# Patient Record
Sex: FEMALE | Race: WHITE | NOT HISPANIC OR LATINO | Employment: OTHER | URBAN - METROPOLITAN AREA
[De-identification: names, ages, dates, MRNs, and addresses within clinical notes are randomized per-mention and may not be internally consistent; named-entity substitution may affect disease eponyms.]

---

## 2019-12-18 ENCOUNTER — THERAPY VISIT (OUTPATIENT)
Dept: PHYSICAL THERAPY | Facility: CLINIC | Age: 84
End: 2019-12-18
Payer: COMMERCIAL

## 2019-12-18 DIAGNOSIS — M54.42 ACUTE LEFT-SIDED LOW BACK PAIN WITH LEFT-SIDED SCIATICA: ICD-10-CM

## 2019-12-18 PROCEDURE — 97161 PT EVAL LOW COMPLEX 20 MIN: CPT | Mod: GP | Performed by: PHYSICAL THERAPIST

## 2019-12-18 PROCEDURE — 97110 THERAPEUTIC EXERCISES: CPT | Mod: GP | Performed by: PHYSICAL THERAPIST

## 2019-12-18 NOTE — PROGRESS NOTES
[facility] Initial Evaluation -- Lumbar    Date: December 18, 2019  Ethel Conrad is a 89 year old female with a lumbar spine condition.   Referral: Ortho  Work mechanical stresses:  NA  Employment status:  Retired  Leisure mechanical stresses: No formal exercise  Functional disability score (TICO/STarT Back):  See flowsheets  VAS score (0-10): 3-7/10 varying intensity  Patient goals/expectations:  Reduce pain in leg and walk without having to rely on cane    HISTORY:    Present symptoms: Lt low back, posterior thigh to lower leg  Pain quality (sharp/shooting/stabbing/aching/burning/cramping):  Achy, and sharp   Paresthesia (yes/no):  No    Present since (onset date): 2 weeks (Dec 2019).     Symptoms (improving/unchanging/worsening):  unchanging.     Symptoms commenced as a result of: VIKY   Condition occurred in the following environment:   Unknown     Symptoms at onset (back/thigh/leg): As above  Constant symptoms (back/thigh/leg):   Intermittent symptoms (back/thigh/leg): As above and unpredictable in nature    Symptoms are made worse with the following: Sometimes Sitting, Sometimes Rising, Sometimes Standing, Sometimes Walking, Time of day - Sometimes PM, Sometimes When still and Sometimes On the move   Symptoms are made better with the following: Other - Heat sometimes    Disturbed sleep (yes/no):  Yes Sleeping postures (prone/sup/side R/L): Lt side    Previous episodes (0/1-5/6-10/11+): 0 Year of first episode:     Previous history: No  Previous treatments: None      Specific Questions:  Cough/Sneeze/Strain (pos/neg): Neg  Bowel/Bladder (normal/abnormal): Normal  Gait (normal/abnormal): Normal  Medications (nil/NSAIDS/analg/steroids/anticoag/other):  Other - High blood pressure  Medical allergies:  None  General health (excellent/good/fair/poor):  Good  Pertinent medical history:  Heart problems and High blood pressure  Imaging (None/Xray/MRI/Other):  X-ray  Recent or major surgery  (yes/no):  Lt TKA   Night pain (yes/no): Yes, will drive from bed but not every night.  Accidents (yes/no): No  Unexplained weight loss (yes/no): No  Barriers at home: Normal  Other red flags: Normal    EXAMINATION    Posture:   Sitting (good/fair/poor): Fair  Standing (good/fair/poor):Fair  Lordosis (red/acc/normal):   Correction of posture (better/worse/no effect): No effect    Lateral Shift (right/left/nil): Nil  Relevant (yes/no):    Other Observations:     Neurological:    Motor deficit:  Hip Flex 4-/5 (B); Quad Rt 5/5 Lt 4+/5; H-S 5/5 (B); DF Rt 5/5 Lt 4+/5  Reflexes:  NT  Sensory deficit:  NT  Dural signs:  Neg slump (B)    Movement Loss:   Claus Mod Min Nil Pain   Flexion    X    Extension   X     Side Gliding R   X     Side Gliding L   X       Test Movements:   During: produces, abolishes, increases, decreases, no effect, centralizing, peripheralizing   After: better, worse, no better, no worse, no effect, centralized, peripheralized    Pretest symptoms standin/10 pain   Symptoms During Symptoms After ROM increased ROM decreased No Effect   FISit No Effect    No Effect         Rep FISit No Effect    No Effect      X   EIS No Effect    No Effect         Rep EIS No Effect    No Effect      X   Pretest symptoms lying:     Symptoms During Symptoms After ROM increased ROM decreased No Effect   BON        Rep BON        EIL        Rep EIL        If required, pretest symptoms:    Symptoms During Symptoms After ROM increased ROM decreased No Effect   SGIS - R        Rep SGIS - R        SGIS - L        Rep SGIS - L          Static Tests:  Sitting slouched:    Sitting erect:    Standing slouched   Standing erect:    Lying prone in extension:   Long sitting:      Other Tests:     Provisional Classification:  Inconclusive/Other - Mechanically Inconclusive    Principle of Management:  Education:  Specificity of exercise and rationale with repeated movements, centralization vs peripheralization and testing EIS when  noticing sx'sto assess response  Equipment provided:  None  Mechanical therapy (Y/N):  Y   Extension principle:  EIS x 10-15 reps 4-5 x daily and when noticing sx's    ASSESSMENT/PLAN:    Patient is a 89 year old female with lumbar complaints.    Patient has the following significant findings with corresponding treatment plan.                Diagnosis 1:  LBP/Lt leg pain    Pain -  self management, education, directional preference exercise and home program  Decreased ROM/flexibility - manual therapy, therapeutic exercise and home program  Decreased joint mobility - manual therapy, therapeutic exercise and home program  Decreased strength - therapeutic exercise, therapeutic activities and home program  Impaired muscle performance - neuro re-education and home program  Decreased function - therapeutic activities and home program  Impaired posture - neuro re-education and home program    Previous and current functional limitations:  (See Goal Flow Sheet for this information)    Short term and Long term goals: (See Goal Flow Sheet for this information)     Communication ability:  Patient appears to be able to clearly communicate and understand verbal and written communication and follow directions correctly.  Treatment Explanation - The following has been discussed with the patient:   RX ordered/plan of care  Anticipated outcomes  Possible risks and side effects  This patient would benefit from PT intervention to resume normal activities.   Rehab potential is good.    Frequency:  1 X week, once daily  Duration:  for 6 weeks  Discharge Plan:  Achieve all LTG.  Independent in home treatment program.  Reach maximal therapeutic benefit.    Please refer to the daily flowsheet for treatment today, total treatment time and time spent performing 1:1 timed codes.

## 2019-12-18 NOTE — LETTER
[facility] PHYSICAL THERAPY  [address]  [facility] [address]  [phone]    2019    Re: Ethel Conrad   :   1926  MRN:  [id number]   REFERRING PHYSICIAN:   Chet Delgado    [facility] PHYSICAL THERAPY    Date of Initial Evaluation:  2019  Visits:   1  Reason for Referral:  Acute left-sided low back pain with left-sided sciatica    [facility] Initial Evaluation -- Lumbar  Date: 2019  Ethel Conrad is a 89 year old female with a lumbar spine condition.   Referral: Ortho  Work mechanical stresses:  NA  Employment status:  Retired  Leisure mechanical stresses: No formal exercise  Functional disability score (TICO/STarT Back):  See flowsheets  VAS score (0-10): 3-7/10 varying intensity  Patient goals/expectations:  Reduce pain in leg and walk without having to rely on cane    HISTORY:  Present symptoms: Lt low back, posterior thigh to lower leg  Pain quality (sharp/shooting/stabbing/aching/burning/cramping):  Achy, and sharp   Paresthesia (yes/no):  No  Present since (onset date): 2 weeks (Dec 2019).     Symptoms (improving/unchanging/worsening):  unchanging.   Symptoms commenced as a result of: VIKY   Condition occurred in the following environment:   Unknown   Symptoms at onset (back/thigh/leg): As above  Constant symptoms (back/thigh/leg):   Intermittent symptoms (back/thigh/leg): As above and unpredictable in nature  Symptoms are made worse with the following: Sometimes Sitting, Sometimes Rising, Sometimes Standing, Sometimes Walking, Time of day - Sometimes PM, Sometimes When still and Sometimes On the move   Symptoms are made better with the following: Other - Heat sometimes  Disturbed sleep (yes/no):  Yes   Sleeping postures (prone/sup/side R/L): Lt side  Previous episodes (0/1-5/6-10/11+): 0   Year of first episode:   Previous history: No  Previous treatments:  None        Re: Ethel Conrad   :   1926    Specific Questions:  Cough/Sneeze/Strain (pos/neg): Neg  Bowel/Bladder (normal/abnormal): Normal  Gait (normal/abnormal): Normal  Medications (nil/NSAIDS/analg/steroids/anticoag/other):  Other - High blood pressure  Medical allergies:  None  General health (excellent/good/fair/poor):  Good  Pertinent medical history:  Heart problems and High blood pressure  Imaging (None/Xray/MRI/Other):  X-ray  Recent or major surgery (yes/no):  Lt TKA   Night pain (yes/no): Yes, will drive from bed but not every night.  Accidents (yes/no): No  Unexplained weight loss (yes/no): No  Barriers at home: Normal  Other red flags: Normal    EXAMINATION    Posture:   Sitting (good/fair/poor): Fair  Standing (good/fair/poor):Fair  Lordosis (red/acc/normal):   Correction of posture (better/worse/no effect): No effect  Lateral Shift (right/left/nil): Nil  Relevant (yes/no):    Other Observations:     Neurological:  Motor deficit:  Hip Flex 4-/5 (B); Quad Rt 5/5 Lt 4+/5; H-S 5/5 (B); DF Rt 5/5 Lt 4+/5  Reflexes:  NT  Sensory deficit:  NT    Dural signs:  Neg slump (B)    Movement Loss:   Claus Mod Min Nil Pain   Flexion    X    Extension   X     Side Gliding R   X     Side Gliding L   X       Test Movements:   During: produces, abolishes, increases, decreases, no effect, centralizing, peripheralizing   After: better, worse, no better, no worse, no effect, centralized, peripheralized            Re: Ethel Conrad   :   1926    Pretest symptoms standin/10 pain   Symptoms During Symptoms After ROM increased ROM decreased No Effect   FISit No Effect    No Effect         Rep FISit No Effect    No Effect      X   EIS No Effect    No Effect         Rep EIS No Effect    No Effect      X   Pretest symptoms lying:     Symptoms During Symptoms After ROM increased ROM decreased No Effect   BON        Rep BON        EIL        Rep EIL        If required, pretest symptoms:    Symptoms During  Symptoms After ROM increased ROM decreased No Effect   SGIS - R        Rep SGIS - R        SGIS - L        Rep SGIS - L        Static Tests:  Sitting slouched:     Sitting erect:    Standing slouched    Standing erect:    Lying prone in extension:    Long sitting:    Other Tests:   Provisional Classification:  Inconclusive/Other - Mechanically Inconclusive  Principle of Management:  Education:  Specificity of exercise and rationale with repeated movements, centralization vs peripheralization and testing EIS when noticing sx'sto assess response  Equipment provided:  None  Mechanical therapy (Y/N):  Y   Extension principle:  EIS x 10-15 reps 4-5 x daily and when noticing sx's    ASSESSMENT/PLAN:  Patient is a 89 year old female with lumbar complaints.    Patient has the following significant findings with corresponding treatment plan.                Diagnosis 1:  LBP/Lt leg pain  Pain -  self management, education, directional preference exercise and home program  Decreased ROM/flexibility - manual therapy, therapeutic exercise and home program  Decreased joint mobility - manual therapy, therapeutic exercise and home program  Decreased strength - therapeutic exercise, therapeutic activities and home program  Impaired muscle performance - neuro re-education and home program  Decreased function - therapeutic activities and home program  Impaired posture - neuro re-education and home program  Re: Ethel A Anamaria   :   1926    ASSESSMENT/PLAN: (continued):  Previous and current functional limitations:  (See Goal Flow Sheet for this information)    Short term and Long term goals: (See Goal Flow Sheet for this information)   Communication ability:  Patient appears to be able to clearly communicate and understand verbal and written communication and follow directions correctly.  Treatment Explanation - The following has been discussed with the patient:   RX ordered/plan of care, Anticipated outcomes, Possible risks  and side effects    This patient would benefit from PT intervention to resume normal activities.   Rehab potential is good.  Frequency:  1 X week, once daily  Duration:  for 6 weeks  Discharge Plan:  Achieve all LTG.  Independent in home treatment program.  Reach maximal therapeutic benefit.    Thank you for your referral.    INQUIRIES          Therapist:  Porter Teague DPT, Cert. MDT  [facility]  [address] [facility] [address]  [facility] [address]  Phone: [phone]  Fax: [phone]

## 2019-12-24 ENCOUNTER — THERAPY VISIT (OUTPATIENT)
Dept: PHYSICAL THERAPY | Facility: CLINIC | Age: 84
End: 2019-12-24
Payer: COMMERCIAL

## 2019-12-24 DIAGNOSIS — M54.42 ACUTE LEFT-SIDED LOW BACK PAIN WITH LEFT-SIDED SCIATICA: ICD-10-CM

## 2019-12-24 PROCEDURE — 97110 THERAPEUTIC EXERCISES: CPT | Mod: GP | Performed by: PHYSICAL THERAPIST

## 2020-01-02 ENCOUNTER — THERAPY VISIT (OUTPATIENT)
Dept: PHYSICAL THERAPY | Facility: CLINIC | Age: 85
End: 2020-01-02
Payer: COMMERCIAL

## 2020-01-02 DIAGNOSIS — M54.42 ACUTE LEFT-SIDED LOW BACK PAIN WITH LEFT-SIDED SCIATICA: ICD-10-CM

## 2020-01-02 PROCEDURE — 97110 THERAPEUTIC EXERCISES: CPT | Mod: GP | Performed by: PHYSICAL THERAPIST

## 2020-01-02 NOTE — PROGRESS NOTES
DISCHARGE REPORT    Progress reporting period is from 12.18.19 to 1.2.2020.       SUBJECTIVE  Subjective changes noted by patient:  Pt reports that she has not been having any pain in back/leg.  Still feeling some weakness when up walking and going up/down stairs but has been better overall.  Still doing stretch 4-5 x daily.    Current Pain level: 0/10.     Initial Pain level: (3-7/10).   Changes in function:  Yes (See Goal flowsheet attached for changes in current functional level)  Adverse reaction to treatment or activity: None    OBJECTIVE  Objective: L/S AROM:  Flex WNL; Ext Min loss; SG WNL (B).  Strength:  Hip Flex 4/5 (B); Knee Ext 5/5 (B); Knee Flex 5/5 (B); DF 5/5 (B); PF 5/5 (B).      ASSESSMENT/PLAN  Updated problem list and treatment plan:   Diagnosis 1:  LBP/Lt Leg pain    Decreased ROM/flexibility - therapeutic exercise and home program  Decreased joint mobility - therapeutic exercise and home program  Decreased strength - therapeutic exercise and home program  Impaired muscle performance - home program  STG/LTGs have been met or progress has been made towards goals:  Yes (See Goal flow sheet completed today.)  Assessment of Progress: The patient's condition is improving.  The patient has met all of their long term goals.  Self Management Plans:  Patient is independent in a home treatment program.  Patient is independent in self management of symptoms.  I have re-evaluated this patient and find that the nature, scope, duration and intensity of the therapy is appropriate for the medical condition of the patient.  Ethel continues to require the following intervention to meet STG and LTG's:  PT intervention is no longer required to meet STG/LTG.    Recommendations:  This patient is ready to be discharged from therapy and continue their home treatment program.

## 2020-01-02 NOTE — LETTER
[facility] ATHLETIC [facility] PHYSICAL THERAPY  [address]   [place] [address]  [phone]    January 15, 2020    Re: Ethel Conrad   :   1926  MRN:  [id number]   REFERRING PHYSICIAN:   Chet Delgado    [facility] PHYSICAL THERAPY    Date of Initial Evaluation:  2019  Visits:    3  Reason for Referral:  Acute left-sided low back pain with left-sided sciatica    DISCHARGE REPORT:  Progress reporting period is from 19 to 2020.       SUBJECTIVE  Subjective changes noted by patient:  Pt reports that she has not been having any pain in back/leg.  Still feeling some weakness when up walking and going up/down stairs but has been better overall.  Still doing stretch 4-5 x daily.    Current Pain level: 0/10.   Initial Pain level: (3-7/10).   Changes in function:  Yes (See Goal flowsheet attached for changes in current functional level). Adverse reaction to treatment or activity: None    OBJECTIVE  Objective: L/S AROM:  Flex WNL; Ext Min loss; SG WNL (B).  Strength:  Hip Flex 4/5 (B); Knee Ext 5/5 (B); Knee Flex 5/5 (B); DF 5/5 (B); PF 5/5 (B).    ASSESSMENT/PLAN  Updated problem list and treatment plan:   Diagnosis 1:  LBP/Lt Leg pain    Decreased ROM/flexibility - therapeutic exercise and home program  Decreased joint mobility - therapeutic exercise and home program  Decreased strength - therapeutic exercise and home program  Impaired muscle performance - home program  STG/LTGs have been met or progress has been made towards goals:  Yes (See Goal flow sheet completed today.)  Assessment of Progress: The patient's condition is improving.  The patient has met all of their long term goals.  Self Management Plans:  Patient is independent in a home treatment program.  Patient is independent in self management of symptoms.  I have re-evaluated this patient and find that the nature, scope, duration and intensity of the therapy is  appropriate for the medical condition of the patient.  Ethel continues to require the following intervention to meet STG and LTG's:  PT intervention is no longer required to meet STG/LTG.            Re: Ethel Conrad   :   1926    Recommendations:  This patient is ready to be discharged from therapy and continue their home treatment program.    Thank you for your referral.    INQUIRIES        Therapist:   Porter Teague DPT, Cert. MDT  [facility] PHYSICAL THERAPY  [address] [address]  Phone: [phone]  Fax: [phone]

## 2021-05-21 ENCOUNTER — RECORDS - HEALTHEAST (OUTPATIENT)
Dept: LAB | Facility: CLINIC | Age: 86
End: 2021-05-21

## 2021-05-21 LAB
ALBUMIN UR-MCNC: NEGATIVE G/DL
APPEARANCE UR: CLEAR
BILIRUB UR QL STRIP: NEGATIVE
COLOR UR AUTO: COLORLESS
GLUCOSE UR STRIP-MCNC: NEGATIVE MG/DL
HGB UR QL STRIP: NEGATIVE
KETONES UR STRIP-MCNC: NEGATIVE MG/DL
LEUKOCYTE ESTERASE UR QL STRIP: NEGATIVE
NITRATE UR QL: NEGATIVE
PH UR STRIP: 6.5 [PH] (ref 5–8)
SP GR UR STRIP: 1.01 (ref 1–1.03)
UROBILINOGEN UR STRIP-ACNC: NORMAL

## 2021-05-22 ENCOUNTER — RECORDS - HEALTHEAST (OUTPATIENT)
Dept: LAB | Facility: CLINIC | Age: 86
End: 2021-05-22

## 2021-05-22 LAB — BACTERIA SPEC CULT: NO GROWTH

## 2021-05-24 LAB
ALBUMIN SERPL-MCNC: 3.7 G/DL (ref 3.5–5)
ALP SERPL-CCNC: 74 U/L (ref 45–120)
ALT SERPL W P-5'-P-CCNC: 14 U/L (ref 0–45)
ANION GAP SERPL CALCULATED.3IONS-SCNC: 16 MMOL/L (ref 5–18)
AST SERPL W P-5'-P-CCNC: 20 U/L (ref 0–40)
BASOPHILS # BLD AUTO: 0.1 THOU/UL (ref 0–0.2)
BASOPHILS NFR BLD AUTO: 1 % (ref 0–2)
BILIRUB SERPL-MCNC: 0.9 MG/DL (ref 0–1)
BUN SERPL-MCNC: 33 MG/DL (ref 8–28)
CALCIUM SERPL-MCNC: 10.1 MG/DL (ref 8.5–10.5)
CHLORIDE BLD-SCNC: 104 MMOL/L (ref 98–107)
CO2 SERPL-SCNC: 28 MMOL/L (ref 22–31)
CREAT SERPL-MCNC: 1.58 MG/DL (ref 0.6–1.1)
EOSINOPHIL # BLD AUTO: 0.2 THOU/UL (ref 0–0.4)
EOSINOPHIL NFR BLD AUTO: 3 % (ref 0–6)
ERYTHROCYTE [DISTWIDTH] IN BLOOD BY AUTOMATED COUNT: 13.3 % (ref 11–14.5)
GFR SERPL CREATININE-BSD FRML MDRD: 30 ML/MIN/1.73M2
GLUCOSE BLD-MCNC: 104 MG/DL (ref 70–125)
HCT VFR BLD AUTO: 42.6 % (ref 35–47)
HGB BLD-MCNC: 13.2 G/DL (ref 12–16)
IMM GRANULOCYTES # BLD: 0 THOU/UL
IMM GRANULOCYTES NFR BLD: 0 %
LYMPHOCYTES # BLD AUTO: 1.4 THOU/UL (ref 0.8–4.4)
LYMPHOCYTES NFR BLD AUTO: 23 % (ref 20–40)
MCH RBC QN AUTO: 30.5 PG (ref 27–34)
MCHC RBC AUTO-ENTMCNC: 31 G/DL (ref 32–36)
MCV RBC AUTO: 98 FL (ref 80–100)
MONOCYTES # BLD AUTO: 0.6 THOU/UL (ref 0–0.9)
MONOCYTES NFR BLD AUTO: 10 % (ref 2–10)
NEUTROPHILS # BLD AUTO: 3.7 THOU/UL (ref 2–7.7)
NEUTROPHILS NFR BLD AUTO: 63 % (ref 50–70)
PLATELET # BLD AUTO: 287 THOU/UL (ref 140–440)
PMV BLD AUTO: 11.4 FL (ref 8.5–12.5)
POTASSIUM BLD-SCNC: 3.5 MMOL/L (ref 3.5–5)
PROT SERPL-MCNC: 6.5 G/DL (ref 6–8)
RBC # BLD AUTO: 4.33 MILL/UL (ref 3.8–5.4)
SODIUM SERPL-SCNC: 148 MMOL/L (ref 136–145)
WBC: 6 THOU/UL (ref 4–11)

## 2021-05-27 ENCOUNTER — RECORDS - HEALTHEAST (OUTPATIENT)
Dept: LAB | Facility: CLINIC | Age: 86
End: 2021-05-27

## 2021-05-28 LAB — POTASSIUM BLD-SCNC: 4.4 MMOL/L (ref 3.5–5)

## 2021-06-08 ENCOUNTER — RECORDS - HEALTHEAST (OUTPATIENT)
Dept: LAB | Facility: CLINIC | Age: 86
End: 2021-06-08

## 2021-06-09 LAB
ANION GAP SERPL CALCULATED.3IONS-SCNC: 9 MMOL/L (ref 5–18)
BUN SERPL-MCNC: 30 MG/DL (ref 8–28)
CALCIUM SERPL-MCNC: 8.7 MG/DL (ref 8.5–10.5)
CHLORIDE BLD-SCNC: 108 MMOL/L (ref 98–107)
CO2 SERPL-SCNC: 26 MMOL/L (ref 22–31)
CREAT SERPL-MCNC: 1.2 MG/DL (ref 0.6–1.1)
GFR SERPL CREATININE-BSD FRML MDRD: 42 ML/MIN/1.73M2
GLUCOSE BLD-MCNC: 77 MG/DL (ref 70–125)
POTASSIUM BLD-SCNC: 3.3 MMOL/L (ref 3.5–5)
SODIUM SERPL-SCNC: 143 MMOL/L (ref 136–145)

## 2021-06-10 ENCOUNTER — RECORDS - HEALTHEAST (OUTPATIENT)
Dept: LAB | Facility: CLINIC | Age: 86
End: 2021-06-10

## 2021-06-12 LAB
ERYTHROCYTE [DISTWIDTH] IN BLOOD BY AUTOMATED COUNT: 13.7 % (ref 11–14.5)
HCT VFR BLD AUTO: 38 % (ref 35–47)
HGB BLD-MCNC: 12.5 G/DL (ref 12–16)
MCH RBC QN AUTO: 31.3 PG (ref 27–34)
MCHC RBC AUTO-ENTMCNC: 32.9 G/DL (ref 32–36)
MCV RBC AUTO: 95 FL (ref 80–100)
PLATELET # BLD AUTO: 180 THOU/UL (ref 140–440)
PMV BLD AUTO: 11.3 FL (ref 8.5–12.5)
RBC # BLD AUTO: 4 MILL/UL (ref 3.8–5.4)
WBC: 5.9 THOU/UL (ref 4–11)

## 2022-06-16 ENCOUNTER — TRANSCRIBE ORDERS (OUTPATIENT)
Dept: OTHER | Age: 87
End: 2022-06-16

## 2022-06-16 DIAGNOSIS — M62.81 MUSCLE WEAKNESS: Primary | ICD-10-CM

## 2022-06-27 ENCOUNTER — THERAPY VISIT (OUTPATIENT)
Dept: PHYSICAL THERAPY | Facility: CLINIC | Age: 87
End: 2022-06-27
Attending: STUDENT IN AN ORGANIZED HEALTH CARE EDUCATION/TRAINING PROGRAM
Payer: COMMERCIAL

## 2022-06-27 DIAGNOSIS — M62.81 MUSCLE WEAKNESS: Primary | ICD-10-CM

## 2022-06-27 PROCEDURE — 97162 PT EVAL MOD COMPLEX 30 MIN: CPT | Mod: GP | Performed by: PHYSICAL THERAPIST

## 2022-06-27 PROCEDURE — 97530 THERAPEUTIC ACTIVITIES: CPT | Mod: GP | Performed by: PHYSICAL THERAPIST

## 2022-06-27 PROCEDURE — 97110 THERAPEUTIC EXERCISES: CPT | Mod: GP | Performed by: PHYSICAL THERAPIST

## 2022-06-27 NOTE — PROGRESS NOTES
"[facility] Initial Evaluation -- Lower Extremity    Evaluation Date: June 27, 2022  Ethel Conrad is a 89 year old female with a Weakness condition.   Referral: DEANNA  Work mechanical stresses: Retired   Employment status:   Leisure mechanical stresses: unable to answer  Functional disability score:   VAS score (0-10): unable to answer  Patient goals/expectations:  Be more steady w/ walking and incr strength to walk longer distances.    HISTORY:    Hx was given by pt.  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  She was in the Hospital then went to Rehab for 5-6 wks.  Weakness L side had resolved and she was walking w/o difficulty.  Pt reports that she has had increase weakness, L>R for the past 2-4 mos making it hard for her to walk.  1-2 mos ago, she was in to see PCP and passed out in the clinic - was sent to the Hospital for a couple of days.    Pt also relates that her LB will begin to hurt and feel weak if she stands or walks for 5-10 min - she needs to sit down to relieve it.  When vacuuming she needs to sit every few min d/t pain/weakness.  Pt lives in a house, 4 stairs in/out w/ railing on R side ascending.  Laundry is in the basement - children do her laundry so she does not have to go into the basement.  She tends to sit on a couch w/ a pillow behind her back when she is sitting.  Walking tolerance is functional distances, 5-10 min - c/o incr \"feeling\" in L leg and drags the L leg the longer she walks.    Stairs are 1 step at a time holding railing and cane.    Present symptoms: Leg Weakness w/ walking  Pain quality (sharp/shooting/stabbing/aching/burning/cramping):      Present since (onset date): Weakness L>R for past 2-3 mos, MD referral 6-    Symptoms (improving/unchaning/worsening):  worsening.      Symptoms commenced as a result of: Unknown   Condition occurred in the following environment: unknown     Symptoms at onset:   Paresthesia (yes/no):  "   Spinal history: yes   Cough/Sneeze (pos/neg):  neg    Constant symptoms:   Intermittent symptoms: LB aching and weakness legs    Symptoms are worse with the following: Always Standing, Always Walking, Always Stairs and Time of day - No effect   Symptoms are better with the following:     Continued use makes the pain (better/worse/no effect): worse    Disturbed night (yes/no): ??? painL shld at night      Pain at rest (yes/no):  no  Site (back/hip/knee/ankle/foot):      Other questions (swelling/clicking/locking/giving way/falling):       Previous episodes: See Above  Previous treatments:     Specific Questions:  General health (excellent/good/fair/poor):  fair  Pertinent medical history includes: Heart problems, Stroke, high cholesterol, Kidney Ds, and weakness  Medications (nil/NSAIDS/analg/steroids/anticoag/other):  Other - High blood pressure,High cholesterol, heart meds,   Medical allergies:  See Epic Chart  Imaging (none/Xray/MRI/other):    Recent or major surgery (yes/no):  TKA 9 yrs, L shld repair  Night pain (yes/no):  L shld if she lies on it  Accidents (yes/no):  no  Unexplained weight loss (yes/no):  no  Barriers at home: see above  Other red flags: Weakness, tingling??    Sites for physical examination (back/hip/knee/ankle/foot/other): back and legs    EXAMINATION    Posture:  Sitting (good/fair/poor): fair    Correction of Posture (better/worse/no effect/NA): NA  Standing (good/fair/poor): Decr Lordosis, Shld Shift L  Other observations:  Pt to PT dept w/ [facility] and wide MASOOD.   Appears somewhat unbalanced when walking.      Neurological: (NA/motor/sensory/reflexes/dural):     Baselines (pain or functional activity): Unsteady w/ walking, weakness legs and LB w/ stdg andwalking    Extremities (Hip / Knee / Ankle / Foot): Bilat legs    Movement Loss R L   Flexion     Extension - knee sitting Slight decr - R groin Pain Slight decr     Passive Movement (+/- over pressure)/(PDM/ERP):  NA d/t time  Resisted  Test Response (pain):       R    L   Hip Flex 4/5(+R groin) 4-/5(R Groin pain)   Quad  4+/5  4+/5   HS  4+/5  4+/5   Ant Tib  4/4  4/5    Other Tests:     Spine:  Movement loss:    Lumbar AROM:    Flex To toes    Ext Min decr - slight LBP    SG R Min decr - L LBP           L Min decr - L knee Pain  Effect of repeated movements:    FISitting  x10 - Reach to floor  x10 - NE (stretch LB)  NE  NE ROM or strength  Effect of static positioning: NA  Spine testing (not relevant/relevant/secondary problem): not relevant???    Baseline Symptoms: NA  Repeated Tests Symptom Response Mechanical Response   Active/Passive movement, resisted test, functional test During -  Produce, Abolish, Increase, Decrease, NE After -  Better, Worse, NB, NW, NE Effect -   ? or ? ROM, strength or key functional test No   Effect          Effect of static positioning                Provisional Classification (Extremity/Spine):  Extremity - Needs further assessment - general weakness &/or derangement/stenosis      Princicple of Management:   Education:  Discussed weakness L>R leg, pain in R groin w/ resisted movements and initial exer w/ R leg (pt states she has not had R groin pain at home), need to determine pt's baseline tolerance for exer, Stressed she needs to rest if she becomes SOB or light headed, and HEP instr.    Equipment provided:  none  Exercise and dosage:  See PTRx flowsheet    ASSESSMENT/PLAN:    Patient is a 89 year old female with lumbar and bilateral legs complaints.    Patient has the following significant findings with corresponding treatment plan.                Diagnosis 1:  Muscle weakness  Pain -  hot/cold therapy, manual therapy, self management, education, directional preference exercise and home program  Decreased ROM/flexibility - manual therapy, therapeutic exercise and home program  Decreased strength - therapeutic exercise, therapeutic activities and home program  Impaired balance - neuro re-education, therapeutic  activities and home program  Impaired gait - gait training and home program  Decreased function - therapeutic activities and home program  Impaired posture - neuro re-education and home program    Therapy Evaluation Codes:   1) History comprised of:   Personal factors that impact the plan of care:      Age.    Comorbidity factors that impact the plan of care are:      Heart problems, High blood pressure, Numbness/tingling, Pain at night/rest, Stroke, Weakness and High cholesterol and Kidney Ds.     Medications impacting care: Cardiac, High blood pressure and High cholesterol.  2) Examination of Body Systems comprised of:   Body structures and functions that impact the plan of care:      Lumbar spine and bilateral legs.   Activity limitations that impact the plan of care are:      Stairs, Standing and Walking.  3) Clinical presentation characteristics are:   Evolving/Changing.  4) Decision-Making    Moderate complexity using standardized patient assessment instrument and/or measureable assessment of functional outcome.  Cumulative Therapy Evaluation is: Moderate complexity.    Previous and current functional limitations:  (See Goal Flow Sheet for this information)    Short term and Long term goals: (See Goal Flow Sheet for this information)     Communication ability:  Patient appears to be able to clearly communicate and understand verbal and written communication and follow directions correctly.  Daughter also present for assessment  Treatment Explanation - The following has been discussed with the patient:   RX ordered/plan of care  Anticipated outcomes  Possible risks and side effects  This patient would benefit from PT intervention to resume normal activities.   Rehab potential is good.    Frequency:  1 X week, once daily  Duration:  for 4 weeks, then 2 x/month for 4 weeks  Discharge Plan:  Achieve all LTG.  Independent in home treatment program.  Reach maximal therapeutic benefit.    Please refer to the daily  flowsheet for treatment today, total treatment time and time spent performing 1:1 timed codes.

## 2022-07-09 NOTE — PROGRESS NOTES
[facility]    OUTPATIENT Physical Therapy ORTHOPEDIC EVALUATION  PLAN OF TREATMENT FOR OUTPATIENT REHABILITATION  (COMPLETE FOR INITIAL CLAIMS ONLY)  Patient's Last Name, First Name, M.I.  YOB: 1926  Ethel Conrad    Provider s Name:  [facility]   Medical Record No.  [id number]   Start of Care Date:  06/27/22   Onset Date:    (Weakness L>R for past 2-3 mos, MD referral 6-)   Type:     _X__PT   ___OT Medical Diagnosis:    Encounter Diagnosis   Name Primary?    Muscle weakness Yes        Treatment Diagnosis:  MM Weakness        Goals:     06/27/22 0500   Body Part   Goals listed below are for Weakness/L leg   Goal #1   Goal #1 ambulation   Previous Functional Level No restrictions   Current Functional Level Minutes patient can walk;with cane   Performance Level functional distances of 5 min, 10 min at most.   STG Target Performance Minutes patient will be able to walk;with cane   Performance Level 10 min w/o needing to rest   Rationale for safe household ambulation;for safe community ambulation   Due Date 08/08/22    LTG Target Performance Minutes patient will be able to  walk   Performance Level 15 min   Rationale for safe community ambulation   Due Date 09/19/22   Goal #2   Goal #2 standing   Previous Functional Level No restrictions   Current Functional Level Minutes patient can stand   Performance level 5 min tolerance - LBP and Wkness   STG Target Performance Minutes patient will be able to stand   Performance level 10 min tolerance   Rationale for personal hygiene;for housekeeping tasks such as vacuuming, bed making, mowing, gardening;for meal preparation   Due date 08/08/22   LTG Target Performance Minutes patient will be able to stand   Performance Level 15 min tolerance   Rationale for meal preparation;for housekeeping tasks such as vacuuming, bed  alexis martinez;for safe community ambulation   Due date 09/19/22       Therapy Frequency:  1 x/week  Predicted Duration of Therapy Intervention:  for 4 weeks, then 2 x/month for 4 weeks    Luli Kumar, PT                 I CERTIFY THE NEED FOR THESE SERVICES FURNISHED UNDER        THIS PLAN OF TREATMENT AND WHILE UNDER MY CARE .             Physician Signature               Date    X_____________________________________________________                         Certification Date From:  06/27/22   Certification Date To:  08/25/22    Referring Provider:  Gregorio Oliver*    Initial Assessment        See Epic Evaluation SOC Date: 06/27/22

## 2022-07-22 ENCOUNTER — THERAPY VISIT (OUTPATIENT)
Dept: PHYSICAL THERAPY | Facility: CLINIC | Age: 87
End: 2022-07-22
Payer: COMMERCIAL

## 2022-07-22 DIAGNOSIS — M62.81 MUSCLE WEAKNESS: Primary | ICD-10-CM

## 2022-07-22 PROCEDURE — 97110 THERAPEUTIC EXERCISES: CPT | Mod: GP | Performed by: PHYSICAL THERAPIST

## 2022-07-22 PROCEDURE — 97530 THERAPEUTIC ACTIVITIES: CPT | Mod: GP | Performed by: PHYSICAL THERAPIST

## 2022-08-01 ENCOUNTER — THERAPY VISIT (OUTPATIENT)
Dept: PHYSICAL THERAPY | Facility: CLINIC | Age: 87
End: 2022-08-01
Payer: COMMERCIAL

## 2022-08-01 DIAGNOSIS — M62.81 MUSCLE WEAKNESS: Primary | ICD-10-CM

## 2022-08-01 PROCEDURE — 97530 THERAPEUTIC ACTIVITIES: CPT | Mod: GP | Performed by: PHYSICAL THERAPIST

## 2022-08-01 PROCEDURE — 97110 THERAPEUTIC EXERCISES: CPT | Mod: GP | Performed by: PHYSICAL THERAPIST

## 2022-08-08 ENCOUNTER — THERAPY VISIT (OUTPATIENT)
Dept: PHYSICAL THERAPY | Facility: CLINIC | Age: 87
End: 2022-08-08
Payer: COMMERCIAL

## 2022-08-08 DIAGNOSIS — M62.81 MUSCLE WEAKNESS: Primary | ICD-10-CM

## 2022-08-08 PROCEDURE — 97110 THERAPEUTIC EXERCISES: CPT | Mod: GP | Performed by: PHYSICAL THERAPIST

## 2022-08-08 PROCEDURE — 97530 THERAPEUTIC ACTIVITIES: CPT | Mod: GP | Performed by: PHYSICAL THERAPIST

## 2023-05-01 NOTE — PROGRESS NOTES
"Discharge Note    Progress reporting period is from last progress note on   to Aug 8, 2022.    Ethel failed to follow up and current status is unknown.  Please see information below for last relevant information on current status.  Patient seen for 4 visits.    SUBJECTIVE  Subjective changes noted by patient:  Pt feeling better especially with regards to the groin pain. Groin pain pretty much resolved, no pain with exercises. Feels more like weakness limiting activities than pain. Pt feels a little more \"shaky\" today, like her legs feel tired. Pt had more leg cramps in the night. Yesterday, pt was baking and standing longer than usual which pt and her daughter think is why she is more tired today. This was the first time in awhile that she was able to stand throughout baking without taking a break. Pt went to the neighborhood night out and walked down a few houses independetly without needing to take a break. Shoulders at night have been bothering her (not a new symptom), \"feels like gravel\" in shoulder joint, L>R.  .  Current pain level is  .     Previous pain level was   (c/o wkness, not pain).   Changes in function:  Yes (See Goal flowsheet attached for changes in current functional level)  Adverse reaction to treatment or activity: None    OBJECTIVE  Changes noted in objective findings: Palpation: minimal TTP and tension to R adductors. Knee valgus with sit<>stands, pt able to correct with verbal and tactile cueing. Slight shortness of breath with NuStep, improved quickly after conclusion of exercise, no dizziness.     ASSESSMENT/PLAN  Diagnosis: MM Weakness   Updated problem list and treatment plan:   Decreased strength - HEP  STG/LTGs have been met or progress has been made towards goals:  Yes, please see goal flowsheet for most current information  Assessment of Progress: current status is unknown.    Last current status: Pt is progressing well   Self Management Plans:  HEP  I have re-evaluated this patient " and find that the nature, scope, duration and intensity of the therapy is appropriate for the medical condition of the patient.  Ethel continues to require the following intervention to meet STG and LTG's:  HEP.    Recommendations:  Discharge with current home program.  Patient to follow up with MD as needed.    Please refer to the daily flowsheet for treatment today, total treatment time and time spent performing 1:1 timed codes.

## 2024-04-15 ENCOUNTER — TRANSCRIBE ORDERS (OUTPATIENT)
Dept: OTHER | Age: 89
End: 2024-04-15

## 2024-04-15 DIAGNOSIS — R26.81 GAIT INSTABILITY: Primary | ICD-10-CM

## 2024-04-18 ENCOUNTER — THERAPY VISIT (OUTPATIENT)
Dept: PHYSICAL THERAPY | Facility: CLINIC | Age: 89
End: 2024-04-18
Payer: COMMERCIAL

## 2024-04-18 DIAGNOSIS — M62.81 MUSCLE WEAKNESS: Primary | ICD-10-CM

## 2024-04-18 DIAGNOSIS — R26.81 GAIT INSTABILITY: ICD-10-CM

## 2024-04-18 PROCEDURE — 97116 GAIT TRAINING THERAPY: CPT | Mod: GP

## 2024-04-18 PROCEDURE — 97161 PT EVAL LOW COMPLEX 20 MIN: CPT | Mod: GP

## 2024-04-18 PROCEDURE — 97110 THERAPEUTIC EXERCISES: CPT | Mod: GP

## 2024-05-07 ENCOUNTER — THERAPY VISIT (OUTPATIENT)
Dept: PHYSICAL THERAPY | Facility: CLINIC | Age: 89
End: 2024-05-07
Payer: COMMERCIAL

## 2024-05-07 DIAGNOSIS — R26.81 GAIT INSTABILITY: ICD-10-CM

## 2024-05-07 DIAGNOSIS — M62.81 MUSCLE WEAKNESS: Primary | ICD-10-CM

## 2024-05-07 PROCEDURE — 97110 THERAPEUTIC EXERCISES: CPT | Mod: GP

## 2024-05-07 PROCEDURE — 97112 NEUROMUSCULAR REEDUCATION: CPT | Mod: GP

## 2024-05-07 PROCEDURE — 97530 THERAPEUTIC ACTIVITIES: CPT | Mod: GP

## 2024-05-13 ENCOUNTER — THERAPY VISIT (OUTPATIENT)
Dept: PHYSICAL THERAPY | Facility: CLINIC | Age: 89
End: 2024-05-13
Payer: COMMERCIAL

## 2024-05-13 DIAGNOSIS — R26.81 GAIT INSTABILITY: ICD-10-CM

## 2024-05-13 DIAGNOSIS — M62.81 MUSCLE WEAKNESS: Primary | ICD-10-CM

## 2024-05-13 PROCEDURE — 97530 THERAPEUTIC ACTIVITIES: CPT | Mod: GP

## 2024-05-13 PROCEDURE — 97112 NEUROMUSCULAR REEDUCATION: CPT | Mod: GP

## 2024-05-13 PROCEDURE — 97110 THERAPEUTIC EXERCISES: CPT | Mod: GP

## 2024-05-20 ENCOUNTER — THERAPY VISIT (OUTPATIENT)
Dept: PHYSICAL THERAPY | Facility: CLINIC | Age: 89
End: 2024-05-20
Payer: COMMERCIAL

## 2024-05-20 DIAGNOSIS — R26.81 GAIT INSTABILITY: ICD-10-CM

## 2024-05-20 DIAGNOSIS — M62.81 MUSCLE WEAKNESS: Primary | ICD-10-CM

## 2024-05-20 PROCEDURE — 97110 THERAPEUTIC EXERCISES: CPT | Mod: GP

## 2024-05-20 PROCEDURE — 97112 NEUROMUSCULAR REEDUCATION: CPT | Mod: GP

## 2024-05-28 ENCOUNTER — THERAPY VISIT (OUTPATIENT)
Dept: PHYSICAL THERAPY | Facility: CLINIC | Age: 89
End: 2024-05-28
Payer: COMMERCIAL

## 2024-05-28 DIAGNOSIS — R26.81 GAIT INSTABILITY: Primary | ICD-10-CM

## 2024-05-28 DIAGNOSIS — M62.81 MUSCLE WEAKNESS: ICD-10-CM

## 2024-05-28 PROCEDURE — 97110 THERAPEUTIC EXERCISES: CPT | Mod: GP

## 2024-05-28 PROCEDURE — 97530 THERAPEUTIC ACTIVITIES: CPT | Mod: GP

## 2024-09-16 ENCOUNTER — LAB REQUISITION (OUTPATIENT)
Dept: LAB | Facility: CLINIC | Age: 89
End: 2024-09-16
Payer: COMMERCIAL

## 2024-09-16 DIAGNOSIS — I50.9 HEART FAILURE, UNSPECIFIED (H): ICD-10-CM

## 2024-09-16 LAB
ANION GAP SERPL CALCULATED.3IONS-SCNC: 12 MMOL/L (ref 7–15)
BASOPHILS # BLD AUTO: 0.1 10E3/UL (ref 0–0.2)
BASOPHILS NFR BLD AUTO: 1 %
BUN SERPL-MCNC: 23.7 MG/DL (ref 8–23)
CALCIUM SERPL-MCNC: 8.8 MG/DL (ref 8.8–10.4)
CHLORIDE SERPL-SCNC: 111 MMOL/L (ref 98–107)
CREAT SERPL-MCNC: 1.25 MG/DL (ref 0.51–0.95)
EGFRCR SERPLBLD CKD-EPI 2021: 39 ML/MIN/1.73M2
EOSINOPHIL # BLD AUTO: 0.1 10E3/UL (ref 0–0.7)
EOSINOPHIL NFR BLD AUTO: 2 %
ERYTHROCYTE [DISTWIDTH] IN BLOOD BY AUTOMATED COUNT: 16.4 % (ref 10–15)
GLUCOSE SERPL-MCNC: 77 MG/DL (ref 70–99)
HCO3 SERPL-SCNC: 23 MMOL/L (ref 22–29)
HCT VFR BLD AUTO: 35.5 % (ref 35–47)
HGB BLD-MCNC: 11.5 G/DL (ref 11.7–15.7)
IMM GRANULOCYTES # BLD: 0 10E3/UL
IMM GRANULOCYTES NFR BLD: 1 %
LYMPHOCYTES # BLD AUTO: 0.8 10E3/UL (ref 0.8–5.3)
LYMPHOCYTES NFR BLD AUTO: 12 %
MCH RBC QN AUTO: 32.3 PG (ref 26.5–33)
MCHC RBC AUTO-ENTMCNC: 32.4 G/DL (ref 31.5–36.5)
MCV RBC AUTO: 100 FL (ref 78–100)
MONOCYTES # BLD AUTO: 0.7 10E3/UL (ref 0–1.3)
MONOCYTES NFR BLD AUTO: 10 %
NEUTROPHILS # BLD AUTO: 4.8 10E3/UL (ref 1.6–8.3)
NEUTROPHILS NFR BLD AUTO: 74 %
NRBC # BLD AUTO: 0 10E3/UL
NRBC BLD AUTO-RTO: 0 /100
PLATELET # BLD AUTO: 177 10E3/UL (ref 150–450)
POTASSIUM SERPL-SCNC: 3.9 MMOL/L (ref 3.4–5.3)
RBC # BLD AUTO: 3.56 10E6/UL (ref 3.8–5.2)
SODIUM SERPL-SCNC: 146 MMOL/L (ref 135–145)
WBC # BLD AUTO: 6.4 10E3/UL (ref 4–11)

## 2024-09-16 PROCEDURE — 80048 BASIC METABOLIC PNL TOTAL CA: CPT | Mod: ORL | Performed by: NURSE PRACTITIONER

## 2024-09-16 PROCEDURE — 85025 COMPLETE CBC W/AUTO DIFF WBC: CPT | Mod: ORL | Performed by: NURSE PRACTITIONER

## 2024-09-22 ENCOUNTER — LAB REQUISITION (OUTPATIENT)
Dept: LAB | Facility: CLINIC | Age: 89
End: 2024-09-22

## 2024-09-22 DIAGNOSIS — I13.10 HYPERTENSIVE HEART AND CHRONIC KIDNEY DISEASE WITHOUT HEART FAILURE, WITH STAGE 1 THROUGH STAGE 4 CHRONIC KIDNEY DISEASE, OR UNSPECIFIED CHRONIC KIDNEY DISEASE: ICD-10-CM

## 2024-09-23 LAB
ANION GAP SERPL CALCULATED.3IONS-SCNC: 15 MMOL/L (ref 7–15)
BUN SERPL-MCNC: 67.9 MG/DL (ref 8–23)
CALCIUM SERPL-MCNC: 9.3 MG/DL (ref 8.8–10.4)
CHLORIDE SERPL-SCNC: 104 MMOL/L (ref 98–107)
CREAT SERPL-MCNC: 2.44 MG/DL (ref 0.51–0.95)
EGFRCR SERPLBLD CKD-EPI 2021: 17 ML/MIN/1.73M2
GLUCOSE SERPL-MCNC: 104 MG/DL (ref 70–99)
HCO3 SERPL-SCNC: 23 MMOL/L (ref 22–29)
POTASSIUM SERPL-SCNC: 3.9 MMOL/L (ref 3.4–5.3)
SODIUM SERPL-SCNC: 142 MMOL/L (ref 135–145)

## 2024-09-23 PROCEDURE — 36415 COLL VENOUS BLD VENIPUNCTURE: CPT | Performed by: NURSE PRACTITIONER

## 2024-09-23 PROCEDURE — 80048 BASIC METABOLIC PNL TOTAL CA: CPT | Performed by: NURSE PRACTITIONER

## 2024-09-29 ENCOUNTER — LAB REQUISITION (OUTPATIENT)
Dept: LAB | Facility: CLINIC | Age: 89
End: 2024-09-29

## 2024-09-29 DIAGNOSIS — I50.9 HEART FAILURE, UNSPECIFIED (H): ICD-10-CM

## 2024-09-30 LAB
ANION GAP SERPL CALCULATED.3IONS-SCNC: 12 MMOL/L (ref 7–15)
BUN SERPL-MCNC: 31.2 MG/DL (ref 8–23)
CALCIUM SERPL-MCNC: 9.3 MG/DL (ref 8.8–10.4)
CHLORIDE SERPL-SCNC: 111 MMOL/L (ref 98–107)
CREAT SERPL-MCNC: 1.23 MG/DL (ref 0.51–0.95)
EGFRCR SERPLBLD CKD-EPI 2021: 40 ML/MIN/1.73M2
GLUCOSE SERPL-MCNC: 106 MG/DL (ref 70–99)
HCO3 SERPL-SCNC: 24 MMOL/L (ref 22–29)
POTASSIUM SERPL-SCNC: 3.5 MMOL/L (ref 3.4–5.3)
SODIUM SERPL-SCNC: 147 MMOL/L (ref 135–145)

## 2024-09-30 PROCEDURE — 36415 COLL VENOUS BLD VENIPUNCTURE: CPT | Performed by: NURSE PRACTITIONER

## 2024-09-30 PROCEDURE — 80048 BASIC METABOLIC PNL TOTAL CA: CPT | Performed by: NURSE PRACTITIONER

## 2024-10-16 ENCOUNTER — LAB REQUISITION (OUTPATIENT)
Dept: LAB | Facility: CLINIC | Age: 89
End: 2024-10-16
Payer: COMMERCIAL

## 2024-10-16 DIAGNOSIS — I50.9 HEART FAILURE, UNSPECIFIED (H): ICD-10-CM

## 2024-10-17 ENCOUNTER — LAB REQUISITION (OUTPATIENT)
Dept: LAB | Facility: CLINIC | Age: 89
End: 2024-10-17
Payer: COMMERCIAL

## 2024-10-17 DIAGNOSIS — I50.9 HEART FAILURE, UNSPECIFIED (H): ICD-10-CM

## 2024-10-18 LAB
ANION GAP SERPL CALCULATED.3IONS-SCNC: 11 MMOL/L (ref 7–15)
BUN SERPL-MCNC: 33.7 MG/DL (ref 8–23)
CALCIUM SERPL-MCNC: 9.4 MG/DL (ref 8.8–10.4)
CHLORIDE SERPL-SCNC: 106 MMOL/L (ref 98–107)
CREAT SERPL-MCNC: 1.61 MG/DL (ref 0.51–0.95)
EGFRCR SERPLBLD CKD-EPI 2021: 29 ML/MIN/1.73M2
GLUCOSE SERPL-MCNC: 118 MG/DL (ref 70–99)
HCO3 SERPL-SCNC: 27 MMOL/L (ref 22–29)
POTASSIUM SERPL-SCNC: 3.4 MMOL/L (ref 3.4–5.3)
SODIUM SERPL-SCNC: 144 MMOL/L (ref 135–145)

## 2024-10-18 PROCEDURE — 36415 COLL VENOUS BLD VENIPUNCTURE: CPT | Performed by: NURSE PRACTITIONER

## 2024-10-18 PROCEDURE — P9604 ONE-WAY ALLOW PRORATED TRIP: HCPCS | Performed by: NURSE PRACTITIONER

## 2024-10-18 PROCEDURE — 82947 ASSAY GLUCOSE BLOOD QUANT: CPT | Performed by: NURSE PRACTITIONER

## 2024-10-18 PROCEDURE — 80048 BASIC METABOLIC PNL TOTAL CA: CPT | Performed by: NURSE PRACTITIONER

## 2024-10-23 ENCOUNTER — LAB REQUISITION (OUTPATIENT)
Dept: LAB | Facility: CLINIC | Age: 89
End: 2024-10-23
Payer: COMMERCIAL

## 2024-10-23 DIAGNOSIS — I50.9 HEART FAILURE, UNSPECIFIED (H): ICD-10-CM

## 2024-10-24 LAB
ANION GAP SERPL CALCULATED.3IONS-SCNC: 8 MMOL/L (ref 7–15)
BUN SERPL-MCNC: 27.9 MG/DL (ref 8–23)
CALCIUM SERPL-MCNC: 8.9 MG/DL (ref 8.8–10.4)
CHLORIDE SERPL-SCNC: 109 MMOL/L (ref 98–107)
CREAT SERPL-MCNC: 1.32 MG/DL (ref 0.51–0.95)
EGFRCR SERPLBLD CKD-EPI 2021: 36 ML/MIN/1.73M2
GLUCOSE SERPL-MCNC: 81 MG/DL (ref 70–99)
HCO3 SERPL-SCNC: 26 MMOL/L (ref 22–29)
POTASSIUM SERPL-SCNC: 3.9 MMOL/L (ref 3.4–5.3)
SODIUM SERPL-SCNC: 143 MMOL/L (ref 135–145)

## 2024-10-24 PROCEDURE — P9603 ONE-WAY ALLOW PRORATED MILES: HCPCS | Performed by: NURSE PRACTITIONER

## 2024-10-24 PROCEDURE — 82947 ASSAY GLUCOSE BLOOD QUANT: CPT | Performed by: NURSE PRACTITIONER

## 2024-10-24 PROCEDURE — 82565 ASSAY OF CREATININE: CPT | Performed by: NURSE PRACTITIONER

## 2024-10-24 PROCEDURE — 36415 COLL VENOUS BLD VENIPUNCTURE: CPT | Performed by: NURSE PRACTITIONER

## 2024-10-24 PROCEDURE — 80048 BASIC METABOLIC PNL TOTAL CA: CPT | Performed by: NURSE PRACTITIONER
